# Patient Record
(demographics unavailable — no encounter records)

---

## 2017-01-02 NOTE — EDDOCDS
Physician Documentation                                                                           

[facility]                                                                         

Name: Melva Tracy                                                                                

Age: 29 yrs                                                                                       

Sex: Female                                                                                       

: 1987                                                                                   

MRN: [id number]                                                                                     

Arrival Date: 2017                                                                          

Time: 16:58                                                                                       

Account#: [id number]                                                                              

Bed I4 / M4                                                                                       

Private MD: Unknown, Family                                                                     

Disposition:                                                                                      

17 22:18 Discharged to Home/Self Care. Impression: Threatened .                     

- Condition is Stable.                                                                            

- Discharge Instructions: Threatened Miscarriage.                                                 

                                                                                                  

- Medication Reconciliation, Local Pharmacy Hours form.                                           

- Follow up: Private Physician; When: 2 - 3 days; Reason: Recheck today's complaints,             

Continuance of care.                                                                            

- Problem is new.                                                                                 

- Symptoms have improved.                                                                         

- Notes: PLEASE FOLLOW UP WITH DR BHAKTA OR AN OB DOCTOR OF YOUR CHOICE , RETURN TO              

THE ER IF THE SYMPTOMS WORSEN OR BECOME CONCERNING, IF YOU ARE UNABLE TO FOLLOW UP              

WITH DR BHAKTA, PLEASE RETURN TO THE ER IN 48 HOURS FOR A REPEAT PREGNANCY HORMONE             

                                                                                                  

                                                                                                  

Historical:                                                                                       

- Allergies: no known allergies;                                                                  

- Home Meds:                                                                                      

1. none                                                                                         

- PSHx: none;                                                                                     

- Social history: Smoking status: Patient/guardian denies using No barriers to                    

communication noted, The patient speaks fluent English.                                         

- Family history: Not pertinent.                                                                  

- : The pt / caregiver states he / she is not on anticoagulants. Home medication list             

is obtained from the patient.                                                                   

- Exposure Risk Screening:: None identified.                                                      

                                                                                                  

                                                                                                  

OB/GYN:                                                                                           

                                                                                             

17:14  2, Full Term 1, Premature 0,  0, Living 1, LMP 2016, Pregnancy    dls 

      Verified, EDC 2017, Gestational age from LMP: 7 weeks 3 days                           

                                                                                                  

Vital Signs:                                                                                      

17:00  / 63; Pulse 80; Resp 18 S; Temp 98.5(O); Pulse Ox 99% on R/A; Weight 45.36 kg /  dd6 

      100 lbs (R); Height 4 ft. 11 in. (149.86 cm) (R);                                           

22:30 BP 97 / 55 LA Sitting (auto/reg); Pulse 62 MON; Resp 18 S; Temp 98.5(TE); Pulse Ox 98%  cln 

      on R/A; Pain 0/10;                                                                          

17:00 Body Mass Index 20.20 (45.36 kg, 149.86 cm)                                             dd6 

                                                                                                  

MDM:                                                                                              

18:51 Financial registration complete.                                                        gjb 

19:02 IV Saline Lock ordered.                                                                 ck7 

19:02 NS 0.9% 1000 ml IV at bolus once ordered.                                               ck7 

19:02 Set up pelvic ordered.                                                                  ck7 

19:03 Type & Screen Ordered.                                                                  EDMS

19:03 CBC with Diff Ordered.                                                                  EDMS

19:03 UA Ordered.                                                                             EDMS

19:03 Hcg, Serum Quantitative Ordered.                                                        EDMS

19:03 GC & Chlamydia Amplification Ordered.                                                   EDMS

19:03 Urine Culture Ordered.                                                                  EDMS

19:03 Wet Prep Ordered.                                                                       EDMS

19:03 US 1st trimester Ordered.                                                               EDMS

20:11 [facility] Payment Agreement was scanned into [employer] and attached to record.               gjb 

20:42 DUPLEX SCAN LIMITED (DOPPLER) Ordered.                                                  EDMS

21:15 UA Reviewed.                                                                            ck7 

21:15 CBC with Diff Reviewed.                                                                 ck7 

21:15 Type & Screen Reviewed.                                                                 ck7

21:15 Hcg, Serum Quantitative Reviewed.                                                       ck7 

21:15 US 1st trimester Reviewed.                                                              ck7 

21:44 Wet Prep Reviewed.                                                                      ck7 

                                                                                                  

Administered Medications:                                                                         

19:27 Drug: NS 0.9% 1000 ml [sodium chloride 0.9 % intravenous solution] Route: IV; Rate:     lf1 

      bolus; Site: right antecubital;                                                             

                                                                                                  

                                                                                                  

Signatures:                                                                                       

Dispatcher [employer]                           Edie Munguia, RN                        NETTIE   dls                                                  

Silvia Colon RN                            RN   lf1                                                  

Buster Gauthier, RPA-C            [id number]                                                  

Rhys MarquezRN                        RN   Chika Wynne                                                  

                                                                                                  

The chart was reviewed and I authenticate all verbal orders and agree with the evaluation and 
treatment provided.Attachments:

20:11 [facility] Payment Agreement                                                                [email or web address] 

                                                                                                  

**************************************************************************************************

MTDD

## 2017-01-02 NOTE — EDDOCDS
Nurse's Notes                                                                                     

[facility]                                                                         

Name: Melva Tracy                                                                                

Age: 29 yrs                                                                                       

Sex: Female                                                                                       

: 1987                                                                                   

MRN: [id number]                                                                                     

Arrival Date: 2017                                                                          

Time: 16:58                                                                                       

Account#: [id number]                                                                              

Bed I4 / M4                                                                                       

Private MD: Unknown, Family Dr                                                                    

Diagnosis: Threatened                                                                     

                                                                                                  

Presentation:                                                                                     

                                                                                             

17:12 Presenting complaint: Patient states: Pt presents with vaginal bleeding bright red on   dls 

      tissue and abdominal cramping onset this morning. Pt thinks she is about 7 weeks            

      pregnant no prenatal care yet. Risk factors: The patient reports no loss of                 

      conciousness prior to arrival. This patient has not had a hysterectomy. This patient        

      has not begun menopause. Adult Sepsis Screening: The patient does not have new or           

      worsening altered mentation. Patient's respiratory rate is less than 22. Systolic blood     

      pressure is greater than 100. Patient has a qSOFA score of 0- Negative Sepsis Screen.       

      Suicide/Homicide risk assessment- the patient denies having any suicidal and/or             

      homicidal ideations and does not present with any other emotional, behavioral or mental     

      health complaints.  Status: Patient is not a  or              

      dependent. Transition of care: patient was not received from another setting of care.       

17:12 Acuity: ARPAN Level 3                                                                     dls 

17:12 Method Of Arrival: Walkin/Carried/Asstd                                                 dls 

                                                                                                  

Triage Assessment:                                                                                

17:14 General: Appears in no apparent distress, slender, well developed, well nourished, well dls 

      groomed, Behavior is cooperative. Pain: Pain currently is 2 out of 10 on a pain scale.      

      HIV screening NA for this visit Offered previously. : Reports vaginal bleeding that       

      is bright red.                                                                              

                                                                                                  

OB/GYN:                                                                                           

17:14  2, Full Term 1, Premature 0,  0, Living 1, LMP 2016, Pregnancy    dls 

      Verified, EDC 2017, Gestational age from LMP: 7 weeks 3 days                           

                                                                                                  

Historical:                                                                                       

- Allergies: no known allergies;                                                                  

- Home Meds:                                                                                      

1. none                                                                                         

- PSHx: none;                                                                                     

- Social history: Smoking status: Patient/guardian denies using No barriers to                    

communication noted, The patient speaks fluent English.                                         

- Family history: Not pertinent.                                                                  

- : The pt / caregiver states he / she is not on anticoagulants. Home medication list             

is obtained from the patient.                                                                   

- Exposure Risk Screening:: None identified.                                                      

                                                                                                  

                                                                                                  

Screenin:28 Screening information is obtained from the patient. Fall risk: No risks identified.     lf1 

      Assistance ADL's: requires no assistance with activities of daily living. Abuse/DV          

      Screen: The patient / caregiver reports he/she is: not in a situation that causes fear,     

      pain or injury. Nutritional screening: No deficits noted. Advance Directives:               

      Currently, there is no health care proxy. home support is adequate.                         

                                                                                                  

Assessment:                                                                                       

19:28 Adult Sepsis Screening: The patient does not have new or worsening altered mentation.   lf1 

      Patient's respiratory rate is less than 22. Systolic blood pressure is greater than         

      100. Patient has a qSOFA score of 0- Negative Sepsis Screen. General: Appears in no         

      apparent distress, comfortable, Behavior is cooperative. Neurological: Level of             

      Consciousness is awake, alert, Oriented to person, place, time. EENT: No deficits           

      noted. Cardiovascular: Chest pain is denied. Respiratory: Respiratory effort is even,       

      unlabored. GI: No deficits noted. : Def to provider Reports vaginal bleeding that is      

      bright red light flow. Derm: Skin is normal.                                                

22:33 General: Patient instructed on discharge instructions. Patient asked if there were any  jmb 

      questions regarding discharge, patient stated no. IV discontinued per hospital policy.      

      Patient signed discharge instructions. Patient discharged in stable condition. .            

                                                                                                  

Vital Signs:                                                                                      

17:00  / 63; Pulse 80; Resp 18 S; Temp 98.5(O); Pulse Ox 99% on R/A; Weight 45.36 kg    dd6 

      (R); Height 4 ft. 11 in. (149.86 cm) (R);                                                   

22:30 BP 97 / 55 LA Sitting (auto/reg); Pulse 62 MON; Resp 18 S; Temp 98.5(TE); Pulse Ox 98%  cln 

      on R/A; Pain 0/10;                                                                          

17:00 Body Mass Index 20.20 (45.36 kg, 149.86 cm)                                             dd6 

                                                                                                  

Vitals:                                                                                           

17:00 Log In Time: 2017 at 16:58.                                                 dd6 

                                                                                                  

ED Course:                                                                                        

16:59 Patient visited by Aurelio Caceres, FELIPE.                                             dd6 

16:59 Unknown, Family  is Private Physician.                                                dd6 

16:59 Patient moved to Waiting                                                                dd6 

17:00 Patient moved to Pre RCE                                                                dd6 

17:14 Triage Initiated                                                                        dls 

18:22 Patient moved to Triage 1                                                               ct3 

18:37 [facility]                                                   ck7 

18:37 Elizabeth Plunkett MD is Attending Physician.                                               ck7 

18:37 Patient visited by Buster Gauthier RPA-C.                                        ck7 

19:02 Patient moved to I4 / M4                                                                b 

19:14 Patient visited by Buster Gauthier RPA-C.                                        ck7 

19:25 Hcg, Serum Quantitative Sent.                                                           lf1 

19:28 The patient / caregiver is instructed regarding the plan of care and ED course.         lf1 

      Accompanied by Family Member, Bed in low position. Call light in reach.                     

19:28 Type & Screen Sent.                                                                     lf1

19:28 Urine Culture Sent.                                                                     lf1 

19:28 UA Sent.                                                                                lf1 

19:28 CBC with Diff Sent.                                                                     lf1 

19:28 Inserted saline lock: 20 gauge in right antecubital area and blood collected. The       lf1 

      patient tolerated the procedure well. Labs drawn. (by ED staff). Sent per order to lab.     

      Urine collected. Clean catch specimen.                                                      

19:32 Patient visited by Silvia Colon RN.                                                        lf1 

19:53 Patient moved to Ultrasound                                                             dmg 

20:10 Patient name changed from Melva\S\\S\Tracy\S\ to Melva\S\[place]\S\Tracy.                       
   EDMS

20:11 [facility] Payment Agreement was scanned into [employer] and attached to record.               gjb 

20:19 Patient moved to I4 / M4                                                                dmg 

20:23 Patient visited by Buster Gauthier RPA-C.                                        ck7 

20:59 US 1st trimester Returned.                                                              EDMS

21:02 Patient visited by Buster Gauthier RPA-C.                                        ck7 

21:44 Patient visited by Buster Gauthier RPA-C.                                        ck7 

22:16 Patient visited by Buster Gauthier RPA-C.                                        ck7 

22:30 Patient visited by Jeanette Aldana PCA.                                               cln 

22:33 Discontinued lock intact, bleeding controlled, pressure dressing applied, No            jmb 

      redness/swelling at site. No procedures done that require assistance.                       

                                                                                                  

Administered Medications:                                                                         

19:27 Drug: NS 0.9% 1000 ml [sodium chloride 0.9 % intravenous solution] Route: IV; Rate:     lf1 

      bolus; Site: right antecubital;                                                             

                                                                                                  

                                                                                                  

Order Results:                                                                                    

Lab Order: CBC with Diff; SPEC'M 17 19:22                                                   

      Test: WHITE BLOOD COUNT; Value: 8.6; Range: 4.0-10.0; Units: K/mm3; Status: F               

      Test: RED BLOOD COUNT; Value: 4.23; Range: 4.00-5.40; Units: M/mm3; Status: F               

      Test: HEMOGLOBIN; Value: 13.3; Range: 12.0-16.0; Units: g/dl; Status: F                     

      Test: HEMATOCRIT; Value: 37.2; Range: 36.0-47.0; Units: %; Status: F                        

      Test: MEAN CORPUSCULAR VOLUME; Value: 87.9; Range: 80.0-96.0; Units: fl; Status: F          

      Test: MEAN CORPUSCULAR HEMOGLOBIN; Value: 31.3; Range: 27.0-33.0; Units: pg; Status: F      

      Test: MEAN CORPUSCULAR HGB CONC; Value: 35.6; Range: 32.0-36.5; Units: g/dl; Status: F      

      Test: RED CELL DISTRIBUTION WIDTH; Value: 12.0; Range: 11.5-14.5; Units: %; Status: F       

      Test: PLATELET COUNT, AUTOMATED; Value: 321; Range: 150-450; Units: k/mm3; Status: F        

      Test: NEUTROPHILS %; Value: 61.7; Range: 36.0-66.0; Units: %; Status: F                     

      Test: LYMPH %; Value: 29.4; Range: 24.0-44.0; Units: %; Status: F                           

      Test: MONO %; Value: 4.8; Range: 0.0-5.0; Units: %; Status: F                               

      Test: EOS %; Value: 1.8; Range: 0.0-3.0; Units: %; Status: F                                

      Test: BASO %; Value: 0.3; Range: 0.0-1.0; Units: %; Status: F                               

      Test: LARGE UNSTAINED CELL %; Value: 2.0; Range: 0.0-4.0; Units: %; Status: F               

      Test: NEUTROPHILS #; Value: 5.3; Range: 1.8-7.7; Units: K/mm3; Status: F                    

      Test: LYMPH #; Value: 2.5; Range: 1.5-6.5; Units: K/mm3; Status: F                          

      Test: MONO #; Value: 0.4; Range: 0.0-0.8; Units: K/mm3; Status: F                           

      Test: EOS #; Value: 0.2; Range: 0.0-0.50; Units: K/mm3; Status: F                           

      Test: BASO #; Value: 0.0; Range: 0.0-0.2; Units: K/mm3; Status: F                           

      Test: LARGE UNSTAINED CELL #; Value: 0.2; Range: 0.0-0.4; Units: K/mm3; Status: F           

Lab Order: UA; SPEC'M 17 19:23                                                              

      Test: APPEARANCE, URINE; Value: HAZY; Range: CLEAR; Status: F                               

      Test: COLOR, URINE; Value: YELLOW; Range: YELLOW; Status: F                                 

      Test: PH,URINE; Value: 6.0; Range: 5.0-9.0; Units: UNITS; Status: F                         

      Test: SPECIFIC GRAVITY URINE AUTO; Value: 1.025; Range: 1.002-1.035; Status: F              

      Test: PROTEIN, URINE AUTO; Value: NEGATIVE; Range: NEGATIVE; Units: mg/dL; Status: F        

      Test: GLUCOSE, URINE (UA) AUTO; Value: NEGATIVE; Range: NEGATIVE; Units: mg/dL; Status:     

      F                                                                                           

      Test: KETONE, URINE AUTO; Value: NEGATIVE; Range: NEGATIVE; Units: mg/dL; Status: F         

      Test: UROBILINOGEN, URINE AUTO; Value: 0.2; Range: 0.0-2.0; Units: mg/dL; Status: F         

      Test: BILIRUBIN, URINE AUTO; Value: NEGATIVE; Range: NEGATIVE; Status: F                    

      Test: NITRITE, URINE AUTO; Value: NEGATIVE; Range: NEGATIVE; Status: F                      

      Test: LEUKOCYTE ESTERASE, URINE AUTO; Value: TRACE; Range: NEGATIVE; Abnormal: Above        

      high normal; Status: F                                                                      

      Test: BLOOD, URINE BLOOD; Value: NEGATIVE; Range: NEGATIVE; Status: F                       

      Test: WBC, URINE AUTO; Value: 1; Range: 0-3; Units: /HPF; Status: F                         

      Test: RBC, URINE AUTO; Value: 1; Range: 0-3; Units: /HPF; Status: F                         

      Test: BACTERIA, URINE AUTO; Value: NEGATIVE; Range: NEGATIVE; Status: F                     

      Test: SQUAMOUS EPITHELIAL CELL UR AU; Value: 3; Range: 0-6; Units: /HPF; Status: F          

      Test: MUCUS, URINE; Value: SMALL; Range: NEGATIVE; Status: F                                

      Test: HYALINE CAST, URINE AUTO; Value: 0; Range: 0-1; Units: /LPF; Status: F                

Lab Order: Type & Screen; SPEC'M 17 19:22                                                 

      Test: BLOOD TYPE; Value: B POS; Status: F                                                   

      Test: AB SCREEN (INDIRECT GEOFF)GEL; Value: NEGATIVE; Status: F                            

Lab Order: Hcg, Serum Quantitative; SPEC'M 17 19:23                                         

      Test: HCG, SERUM QUANTITATIVE; Value: 50047; Units: MIU/ML; Status: F                       

      Test Note: &nbsp;; GESTATIONAL AGE APPROXIMATE HCG RANGE (MIU/ML) -----------             

      -------------- 0.2-1 WEEK 5-50 1-2 WEEKS  2-3 WEEKS 100-5,000 3-4 WEEKS               

      500-10,000 4-5 WEEKS 1,000-50,000 5-6 WEEKS 10,000-100,000 6-8 WEEKS 15,000-200,000 2-3     

      MONTHS 10,000-100,000 NON PREGNANT FEMALES LESS THAN 3.0 Patient samples may contain        

      human heterophilic antibodies that could react with immunoassays to give falsely            

      elevated or depressed results. This assay has been designed to minimize interference        

      from heterophilic antibodies. Elevated hCG levels have also been associated with            

      trophoblastic disease and nontrophoblastic neoplasms. The possibility of having these       

      diseases should be considered before a diagnosis of pregnancy is made. This test is not     

      intended for use as a surrogate marker for aiding in the diagnosis or monitoring the        

      treatment of cancer patients. Siemens Vista methodology.                                    

Lab Order: [facility]                                                        

      Test: WET PREP; Value: WET PREP RESULT; Status: F                                           

      Test: WET PREP; Value: MANY EPITHELIAL CELLS PRESENT; Status: F                             

      Test: WET PREP; Value: MANY WBC; Status: F                                                  

      Test: WET PREP; Value: MANY SHORT RODS PRESENT; Status: F                                   

      Test: WET PREP; Value: FEW LONG RODS PRESENT; Status: F                                     

      Test: WET PREP; Value: Comments:; Status: F                                                 

      Test Note: &nbsp;; Specimen did not meet the 1 hour time limit from collection to         

      examination. Trichomonas vaginalis loses motility quickly therefore, samples need to be     

      examined within 1 hour of collection to optimally identify this organism.                   

                                                                                                  

Radiology Order: US 1st trimester                                                                 

      Test: US 1st trimester                                                                      

      REASON FOR EXAMINATION: Bleeding; Clinical: Early pregnancy with vaginal bleeding.; ;       

      Technique: Transabdominal obstetrical ultrasound with color Doppler evaluation; of the      

      maternal ovaries and gestation.; ; Findings:; Anteverted uterus measures 10.4 x 5.5 x       

      7.6 cm. A single live early intrauterine; pregnancy is identified. Gestational sac with     

      yolk sac and fetal pole noted.; CRL of 6 mm corresponds to 6 weeks 3 days gestational       

      age with estimated date of; delivery 2017. Fetal heart rate equals 100 beats per      

      minute. Two; subchorionic hemorrhages are identified measuring 2.9 x 1.9 x 2.9 cm along     

      the; lower uterine segment and 1.7 x 1.5 x 1.0 cm along the left side of the;               

      gestational sac.; ; Maternal ovaries are normal in appearance and vascularity. Left         

      ovary measures; 3.0 x 3.6 x 2.0 cm; RI equal 0.59. Right ovary measures 3.4 x 2.5 x 2.7     

      cm; RI; equal 0.62. No significant pelvic fluid.; ; Impression:; 1. Single live early       

      intrauterine pregnancy at 6 weeks 3 days gestational age.; Complete anatomical              

      assessment should be performed at 19-20 weeks.; 2. Subchorionic hemorrhages as noted        

      above.; ; ; Signed by; Frank Wagoner MD 2017 08:40 P;                                

Outcome:                                                                                          

22:18 Discharge ordered by Provider.                                                          ck7 

22:33 Discharge Assessment: Patient awake, alert and oriented x 3. No cognitive and/or        jmb 

      functional deficits noted. Patient verbalized understanding of disposition                  

      instructions. Patient awake and alert. obeys commands, Oriented to person, place and        

      time. Patient verbalized understanding of disposition instructions. Patient has no          

      functional deficits. patient administered narcotics - no. The following High Risk           

      Discharge criteria are identified: None. Discharged to home ambulatory, with                

      significant other. Condition: stable. Discharge instructions given to patient,              

      Instructed on discharge instructions, follow up and referral plans. Demonstrated            

      understanding of instructions, Pt was receptive of discharge instructions/ teaching. No     

      special radiology studies were completed. Property sent home with patient.                  

22:35 Patient left the ED.                                                                    luis m 

                                                                                                  

Signatures:                                                                                       

Dispatcher [employer]                           EDEdie Burch, RN                        RN   Theresa Zazueta LisaRN                            RN   lf1                                                  

Aurelio Caceres, PCA                 PCA  dd6                                                  

Sewell, Ariella, PCA                  PCA  ct3                                                  

Buster Gauthier, RPA-C            RPA-Cck7                                                  

Rhys Marquez RN RN jmb Beck, Gabriela gjb Nichols, Jeanette, PCA                   PCA  cln                                                  

                                                                                                  

**************************************************************************************************

MTDD

## 2017-01-02 NOTE — REP
Clinical:  Early pregnancy with vaginal bleeding.

 

Technique:  Transabdominal obstetrical ultrasound with color Doppler evaluation

of the maternal ovaries and gestation.

 

Findings:

Anteverted uterus measures 10.4 x 5.5 x 7.6 cm.  A single live early intrauterine

pregnancy is identified.  Gestational sac with yolk sac and fetal pole noted.

CRL of 6 mm corresponds to 6 weeks 3 days gestational age with estimated date of

delivery 08/25/2017.  Fetal heart rate equals 100 beats per minute.  Two

subchorionic hemorrhages are identified measuring 2.9 x 1.9 x 2.9 cm along the

lower uterine segment and 1.7 x 1.5 x 1.0 cm along the left side of the

gestational sac.

 

Maternal ovaries are normal in appearance and vascularity. Left ovary measures

3.0 x 3.6 x 2.0 cm; RI equal 0.59.  Right ovary measures 3.4 x 2.5 x 2.7 cm; RI

equal 0.62.  No significant pelvic fluid.

 

Impression:

1.  Single live early intrauterine pregnancy at 6 weeks 3 days gestational age.

Complete anatomical assessment should be performed at 19-20 weeks.

2.  Subchorionic hemorrhages as noted above.

 

 

Signed by

Frank Wagoner MD 01/02/2017 08:40 P

## 2017-01-04 NOTE — EDDOCDS
Nurse's Notes                                                                                     

[facility]                                                                         

Name: Melva Tracy                                                                                

Age: 29 yrs                                                                                       

Sex: Female                                                                                       

: 1987                                                                                   

MRN: [id number]                                                                                     

Arrival Date: 2017                                                                          

Time: 16:58                                                                                       

Account#: [id number]                                                                              

Bed I4 / M4                                                                                       

Private MD: Unknown, Family Dr                                                                    

Diagnosis: Threatened                                                                     

                                                                                                  

Presentation:                                                                                     

                                                                                             

17:12 Presenting complaint: Patient states: Pt presents with vaginal bleeding bright red on   dls 

      tissue and abdominal cramping onset this morning. Pt thinks she is about 7 weeks            

      pregnant no prenatal care yet. Risk factors: The patient reports no loss of                 

      conciousness prior to arrival. This patient has not had a hysterectomy. This patient        

      has not begun menopause. Adult Sepsis Screening: The patient does not have new or           

      worsening altered mentation. Patient's respiratory rate is less than 22. Systolic blood     

      pressure is greater than 100. Patient has a qSOFA score of 0- Negative Sepsis Screen.       

      Suicide/Homicide risk assessment- the patient denies having any suicidal and/or             

      homicidal ideations and does not present with any other emotional, behavioral or mental     

      health complaints.  Status: Patient is not a  or              

      dependent. Transition of care: patient was not received from another setting of care.       

17:12 Acuity: ARPAN Level 3                                                                     dls 

17:12 Method Of Arrival: Walkin/Carried/Asstd                                                 dls 

                                                                                                  

Triage Assessment:                                                                                

17:14 General: Appears in no apparent distress, slender, well developed, well nourished, well dls 

      groomed, Behavior is cooperative. Pain: Pain currently is 2 out of 10 on a pain scale.      

      HIV screening NA for this visit Offered previously. : Reports vaginal bleeding that       

      is bright red.                                                                              

                                                                                                  

OB/GYN:                                                                                           

17:14  2, Full Term 1, Premature 0,  0, Living 1, LMP 2016, Pregnancy    dls 

      Verified, EDC 2017, Gestational age from LMP: 7 weeks 3 days                           

                                                                                                  

Historical:                                                                                       

- Allergies: no known allergies;                                                                  

- Home Meds:                                                                                      

1. none                                                                                         

- PSHx: none;                                                                                     

- Social history: Smoking status: Patient/guardian denies using No barriers to                    

communication noted, The patient speaks fluent English.                                         

- Family history: Not pertinent.                                                                  

- : The pt / caregiver states he / she is not on anticoagulants. Home medication list             

is obtained from the patient.                                                                   

- Exposure Risk Screening:: None identified.                                                      

                                                                                                  

                                                                                                  

Screenin:28 Screening information is obtained from the patient. Fall risk: No risks identified.     lf1 

      Assistance ADL's: requires no assistance with activities of daily living. Abuse/DV          

      Screen: The patient / caregiver reports he/she is: not in a situation that causes fear,     

      pain or injury. Nutritional screening: No deficits noted. Advance Directives:               

      Currently, there is no health care proxy. home support is adequate.                         

                                                                                                  

Assessment:                                                                                       

19:28 Adult Sepsis Screening: The patient does not have new or worsening altered mentation.   lf1 

      Patient's respiratory rate is less than 22. Systolic blood pressure is greater than         

      100. Patient has a qSOFA score of 0- Negative Sepsis Screen. General: Appears in no         

      apparent distress, comfortable, Behavior is cooperative. Neurological: Level of             

      Consciousness is awake, alert, Oriented to person, place, time. EENT: No deficits           

      noted. Cardiovascular: Chest pain is denied. Respiratory: Respiratory effort is even,       

      unlabored. GI: No deficits noted. : Def to provider Reports vaginal bleeding that is      

      bright red light flow. Derm: Skin is normal.                                                

22:33 General: Patient instructed on discharge instructions. Patient asked if there were any  jmb 

      questions regarding discharge, patient stated no. IV discontinued per hospital policy.      

      Patient signed discharge instructions. Patient discharged in stable condition. .            

                                                                                                  

Vital Signs:                                                                                      

17:00  / 63; Pulse 80; Resp 18 S; Temp 98.5(O); Pulse Ox 99% on R/A; Weight 45.36 kg    dd6 

      (R); Height 4 ft. 11 in. (149.86 cm) (R);                                                   

22:30 BP 97 / 55 LA Sitting (auto/reg); Pulse 62 MON; Resp 18 S; Temp 98.5(TE); Pulse Ox 98%  cln 

      on R/A; Pain 0/10;                                                                          

17:00 Body Mass Index 20.20 (45.36 kg, 149.86 cm)                                             dd6 

                                                                                                  

Vitals:                                                                                           

17:00 Log In Time: 2017 at 16:58.                                                 dd6 

                                                                                                  

ED Course:                                                                                        

16:59 Patient visited by Aurelio Caceres, FELIPE.                                             dd6 

16:59 Unknown, Family  is Private Physician.                                                dd6 

16:59 Patient moved to Waiting                                                                dd6 

17:00 Patient moved to Pre RCE                                                                dd6 

17:14 Triage Initiated                                                                        dls 

18:22 Patient moved to Triage 1                                                               ct3 

18:37 [facility]                                                   ck7 

18:37 Elizabeth Plunkett MD is Attending Physician.                                               ck7 

18:37 Patient visited by Buster Gauthier RPA-C.                                        ck7 

19:02 Patient moved to I4 / M4                                                                b 

19:14 Patient visited by Buster Gauthier RPA-C.                                        ck7 

19:25 Hcg, Serum Quantitative Sent.                                                           lf1 

19:28 The patient / caregiver is instructed regarding the plan of care and ED course.         lf1 

      Accompanied by Family Member, Bed in low position. Call light in reach.                     

19:28 Type & Screen Sent.                                                                     lf1

19:28 Urine Culture Sent.                                                                     lf1 

19:28 UA Sent.                                                                                lf1 

19:28 CBC with Diff Sent.                                                                     lf1 

19:28 Inserted saline lock: 20 gauge in right antecubital area and blood collected. The       lf1 

      patient tolerated the procedure well. Labs drawn. (by ED staff). Sent per order to lab.     

      Urine collected. Clean catch specimen.                                                      

19:32 Patient visited by Silvia Colon RN.                                                        lf1 

19:53 Patient moved to Ultrasound                                                             dmg 

20:10 Patient name changed from Melva\S\\S\Tracy\S\ to Melva\S\[place]\S\Tracy.                       
   EDMS

20:11 [facility] Payment Agreement was scanned into [employer]book[employer] and attached to record.               gjb 

20:19 Patient moved to I4 / M4                                                                dmg 

20:23 Patient visited by Buster Gauthier RPA-C.                                        ck7 

20:59 US 1st trimester Returned.                                                              EDMS

21:02 Patient visited by Buster Gauthier RPA-C.                                        ck7 

21:44 Patient visited by Buster Gauthier RPA-C.                                        ck7 

22:16 Patient visited by Buster Gauthier RPA-C.                                        ck7 

22:30 Patient visited by Jeanette Aldana PCA.                                               cln 

22:33 Discontinued lock intact, bleeding controlled, pressure dressing applied, No            jmb 

      redness/swelling at site. No procedures done that require assistance.                       

                                                                                             

05:38 T-Sheet-- Draft Copy was scanned into DocbookMD and attached to record.                   lja 

                                                                                                  

Administered Medications:                                                                         

                                                                                             

19:27 Drug: NS 0.9% 1000 ml [sodium chloride 0.9 % intravenous solution] Route: IV; Rate:     lf1 

      bolus; Site: right antecubital;                                                             

                                                                                                  

                                                                                                  

Order Results:                                                                                    

Lab Order: CBC with Diff; SPEC'M 17 19:22                                                   

      Test: WHITE BLOOD COUNT; Value: 8.6; Range: 4.0-10.0; Units: K/mm3; Status: F               

      Test: RED BLOOD COUNT; Value: 4.23; Range: 4.00-5.40; Units: M/mm3; Status: F               

      Test: HEMOGLOBIN; Value: 13.3; Range: 12.0-16.0; Units: g/dl; Status: F                     

      Test: HEMATOCRIT; Value: 37.2; Range: 36.0-47.0; Units: %; Status: F                        

      Test: MEAN CORPUSCULAR VOLUME; Value: 87.9; Range: 80.0-96.0; Units: fl; Status: F          

      Test: MEAN CORPUSCULAR HEMOGLOBIN; Value: 31.3; Range: 27.0-33.0; Units: pg; Status: F      

      Test: MEAN CORPUSCULAR HGB CONC; Value: 35.6; Range: 32.0-36.5; Units: g/dl; Status: F      

      Test: RED CELL DISTRIBUTION WIDTH; Value: 12.0; Range: 11.5-14.5; Units: %; Status: F       

      Test: PLATELET COUNT, AUTOMATED; Value: 321; Range: 150-450; Units: k/mm3; Status: F        

      Test: NEUTROPHILS %; Value: 61.7; Range: 36.0-66.0; Units: %; Status: F                     

      Test: LYMPH %; Value: 29.4; Range: 24.0-44.0; Units: %; Status: F                           

      Test: MONO %; Value: 4.8; Range: 0.0-5.0; Units: %; Status: F                               

      Test: EOS %; Value: 1.8; Range: 0.0-3.0; Units: %; Status: F                                

      Test: BASO %; Value: 0.3; Range: 0.0-1.0; Units: %; Status: F                               

      Test: LARGE UNSTAINED CELL %; Value: 2.0; Range: 0.0-4.0; Units: %; Status: F               

      Test: NEUTROPHILS #; Value: 5.3; Range: 1.8-7.7; Units: K/mm3; Status: F                    

      Test: LYMPH #; Value: 2.5; Range: 1.5-6.5; Units: K/mm3; Status: F                          

      Test: MONO #; Value: 0.4; Range: 0.0-0.8; Units: K/mm3; Status: F                           

      Test: EOS #; Value: 0.2; Range: 0.0-0.50; Units: K/mm3; Status: F                           

      Test: BASO #; Value: 0.0; Range: 0.0-0.2; Units: K/mm3; Status: F                           

      Test: LARGE UNSTAINED CELL #; Value: 0.2; Range: 0.0-0.4; Units: K/mm3; Status: F           

Lab Order: UA; SPEC'M 17 19:23                                                              

      Test: APPEARANCE, URINE; Value: HAZY; Range: CLEAR; Status: F                               

      Test: COLOR, URINE; Value: YELLOW; Range: YELLOW; Status: F                                 

      Test: PH,URINE; Value: 6.0; Range: 5.0-9.0; Units: UNITS; Status: F                         

      Test: SPECIFIC GRAVITY URINE AUTO; Value: 1.025; Range: 1.002-1.035; Status: F              

      Test: PROTEIN, URINE AUTO; Value: NEGATIVE; Range: NEGATIVE; Units: mg/dL; Status: F        

      Test: GLUCOSE, URINE (UA) AUTO; Value: NEGATIVE; Range: NEGATIVE; Units: mg/dL; Status:     

      F                                                                                           

      Test: KETONE, URINE AUTO; Value: NEGATIVE; Range: NEGATIVE; Units: mg/dL; Status: F         

      Test: UROBILINOGEN, URINE AUTO; Value: 0.2; Range: 0.0-2.0; Units: mg/dL; Status: F         

      Test: BILIRUBIN, URINE AUTO; Value: NEGATIVE; Range: NEGATIVE; Status: F                    

      Test: NITRITE, URINE AUTO; Value: NEGATIVE; Range: NEGATIVE; Status: F                      

      Test: LEUKOCYTE ESTERASE, URINE AUTO; Value: TRACE; Range: NEGATIVE; Abnormal: Above        

      high normal; Status: F                                                                      

      Test: BLOOD, URINE BLOOD; Value: NEGATIVE; Range: NEGATIVE; Status: F                       

      Test: WBC, URINE AUTO; Value: 1; Range: 0-3; Units: /HPF; Status: F                         

      Test: RBC, URINE AUTO; Value: 1; Range: 0-3; Units: /HPF; Status: F                         

      Test: BACTERIA, URINE AUTO; Value: NEGATIVE; Range: NEGATIVE; Status: F                     

      Test: SQUAMOUS EPITHELIAL CELL UR AU; Value: 3; Range: 0-6; Units: /HPF; Status: F          

      Test: MUCUS, URINE; Value: SMALL; Range: NEGATIVE; Status: F                                

      Test: HYALINE CAST, URINE AUTO; Value: 0; Range: 0-1; Units: /LPF; Status: F                

Lab Order: Urine Culture; SPEC'M 17 19:23                                                   

      Test: URINE CULTURE; Value: URINE CULTURE RESULT NO GROWTH; Status: F                       

Lab Order: Type & Screen; SPEC 17 19:22                                                 

      Test: BLOOD TYPE; Value: B POS; Status: F                                                   

      Test: AB SCREEN (INDIRECT GEOFF)GEL; Value: NEGATIVE; Status: F                            

Lab Order: Hcg, Serum Quantitative; SPEC'M 17 19:23                                         

      Test: HCG, SERUM QUANTITATIVE; Value: [id number]; Units: MIU/ML; Status: F                       

      Test Note: &nbsp;; GESTATIONAL AGE APPROXIMATE HCG RANGE (MIU/ML) -----------             

      -------------- 0.2-1 WEEK 5-50 1-2 WEEKS  2-3 WEEKS 100-5,000 3-4 WEEKS               

      500-10,000 4-5 WEEKS 1,000-50,000 5-6 WEEKS 10,000-100,000 6-8 WEEKS 15,000-200,000 2-3     

      MONTHS 10,000-100,000 NON PREGNANT FEMALES LESS THAN 3.0 Patient samples may contain        

      human heterophilic antibodies that could react with immunoassays to give falsely            

      elevated or depressed results. This assay has been designed to minimize interference        

      from heterophilic antibodies. Elevated hCG levels have also been associated with            

      trophoblastic disease and nontrophoblastic neoplasms. The possibility of having these       

      diseases should be considered before a diagnosis of pregnancy is made. This test is not     

      intended for use as a surrogate marker for aiding in the diagnosis or monitoring the        

      treatment of cancer patients. Siemens Vista methodology.                                    

Lab Order: GC & Chlamydia Amplification; SPEC 17 19:10                                  

      Test: CHLAMYDIA DNA AMPLIFICATION; Value: NEGATIVE; Range: NEGATIVE; Status: F              

      Test: GC DNA AMPLIFICATION; Value: NEGATIVE; Range: NEGATIVE; Status: F                     

Lab Order: Wet Prep; SPEC'M 17 19:10                                                        

      Test: WET PREP; Value: WET PREP RESULT; Status: F                                           

      Test: WET PREP; Value: MANY EPITHELIAL CELLS PRESENT; Status: F                             

      Test: WET PREP; Value: MANY WBC; Status: F                                                  

      Test: WET PREP; Value: MANY SHORT RODS PRESENT; Status: F                                   

      Test: WET PREP; Value: FEW LONG RODS PRESENT; Status: F                                     

      Test: WET PREP; Value: Comments:; Status: F                                                 

      Test Note: &nbsp;; Specimen did not meet the 1 hour time limit from collection to         

      examination. Trichomonas vaginalis loses motility quickly therefore, samples need to be     

      examined within 1 hour of collection to optimally identify this organism.                   

                                                                                                  

Radiology Order: US 1st trimester                                                                 

      Test: US 1st trimester                                                                      

      REASON FOR EXAMINATION: Bleeding; Clinical: Early pregnancy with vaginal bleeding.; ;       

      Technique: Transabdominal obstetrical ultrasound with color Doppler evaluation; of the      

      maternal ovaries and gestation.; ; Findings:; Anteverted uterus measures 10.4 x 5.5 x       

      7.6 cm. A single live early intrauterine; pregnancy is identified. Gestational sac with     

      yolk sac and fetal pole noted.; CRL of 6 mm corresponds to 6 weeks 3 days gestational       

      age with estimated date of; delivery 2017. Fetal heart rate equals 100 beats per      

      minute. Two; subchorionic hemorrhages are identified measuring 2.9 x 1.9 x 2.9 cm along     

      the; lower uterine segment and 1.7 x 1.5 x 1.0 cm along the left side of the;               

      gestational sac.; ; Maternal ovaries are normal in appearance and vascularity. Left         

      ovary measures; 3.0 x 3.6 x 2.0 cm; RI equal 0.59. Right ovary measures 3.4 x 2.5 x 2.7     

      cm; RI; equal 0.62. No significant pelvic fluid.; ; Impression:; 1. Single live early       

      intrauterine pregnancy at 6 weeks 3 days gestational age.; Complete anatomical              

      assessment should be performed at 19-20 weeks.; 2. Subchorionic hemorrhages as noted        

      above.; ; ; Signed by; Frank Wagoner MD 2017 08:40 P;                                

Outcome:                                                                                          

22:18 Discharge ordered by Provider.                                                          ck7 

22:33 Discharge Assessment: Patient awake, alert and oriented x 3. No cognitive and/or        jmb 

      functional deficits noted. Patient verbalized understanding of disposition                  

      instructions. Patient awake and alert. obeys commands, Oriented to person, place and        

      time. Patient verbalized understanding of disposition instructions. Patient has no          

      functional deficits. patient administered narcotics - no. The following High Risk           

      Discharge criteria are identified: None. Discharged to home ambulatory, with                

      significant other. Condition: stable. Discharge instructions given to patient,              

      Instructed on discharge instructions, follow up and referral plans. Demonstrated            

      understanding of instructions, Pt was receptive of discharge instructions/ teaching. No     

      special radiology studies were completed. Property sent home with patient.                  

22:35 Patient left the ED.                                                                    luis m 

                                                                                                  

Signatures:                                                                                       

Dispatcher [employer]                           EDMS                                                 

Edie Pendleton, RN                        RN   Theresa Zazueta Lisa,RN                            RN   lf1                                                  

Aurelio Caceres, PCA                 PCA  dd6                                                  

Ariella Sewell, PCA                  PCA  ct3                                                  

Buster Gauthier, RPA-C            RPA-Cck7                                                  

Rhys MarquezRN                        RN   luis m Ramirez, Chika Laws Crystal, PCA                   PCA  cln                                                  

                                                                                                  

**************************************************************************************************



*** Chart Complete ***
GAURAV

## 2017-01-04 NOTE — EDDOCDS
Physician Documentation                                                                           

[facility]                                                                         

Name: Melva Tracy                                                                                

Age: 29 yrs                                                                                       

Sex: Female                                                                                       

: 1987                                                                                   

MRN: [id number]                                                                                     

Arrival Date: 2017                                                                          

Time: 10:26                                                                                       

Account#: [id number]                                                                              

Bed PR                                                                                      

Private MD: Unknown, Family                                                                     

Disposition:                                                                                      

17 11:47 Discharged to Home/Self Care. Impression: Pregnant state.                          

- Condition is Stable.                                                                            

- Discharge Instructions: First Trimester of Pregnancy.                                           

                                                                                                  

- Medication Reconciliation, Local Pharmacy Hours form.                                           

- Follow up: Emergency Department; When: As needed; Reason: Worsening of conditions.              

Follow up: Javier Rivero; When: Call to arrange an appointment; Reason:                       

Wound/Symptom Recheck, Further diagnostic work-up, Recheck today's complaints,                  

Continuance of care, To establish care.                                                         

- Problem is new.                                                                                 

- Symptoms are unchanged.                                                                         

- Notes: YOUR HCG ON 17 WAS 49,549. TODAY, IT WAS 56,368 WHICH IS A NORMAL INCREASE           

FOR A HEALTHY PREGNANCY. PLEASE CALL THE OB-GYN OFFICE TODAY TO SCHEDULE AN                     

APPOINTMENT TO BE SEEN. LET THEM KNOW YOU WERE SEEN IN THE ER AND THE SYMPTOMS YOU              

WERE HAVING. ANY WORSENING SYMPTOMS, PLEASE RETURN TO THE ER.                                   

                                                                                                  

                                                                                                  

Historical:                                                                                       

- Allergies: no known allergies;                                                                  

- Home Meds:                                                                                      

1. none                                                                                         

- PMHx: none;                                                                                     

- PSHx: none;                                                                                     

- Social history: Smoking status: Patient states was never smoker of tobacco. No                  

barriers to communication noted, The patient speaks fluent English, Speaks                      

appropriately for age.                                                                          

- Family history: Not pertinent.                                                                  

- : The pt / caregiver states he / she is not on anticoagulants. Home medication list             

is obtained from the patient.                                                                   

- Exposure Risk Screening:: None identified.                                                      

                                                                                                  

                                                                                                  

OB/GYN:                                                                                           

                                                                                             

10:32 LMP 2016, Pregnancy Verified, EDC 2017, Gestational age from LMP: 7 weeks 5  mlb1

      days                                                                                        

                                                                                                  

Vital Signs:                                                                                      

10:27  / 61; Pulse 69; Resp 18 S; Temp 97.7(O); Pulse Ox 100% on R/A; Weight 45.36 kg / dd6 

      100 lbs (R); Height 4 ft. 11 in. (149.86 cm) (R);                                           

11:38  / 61; Pulse 62; Resp 18; Temp 97.9(O); Pulse Ox 100% on R/A; Pain 1/10;          ct3 

10:27 Body Mass Index 20.20 (45.36 kg, 149.86 cm)                                             dd6 

                                                                                                  

MDM:                                                                                              

10:39 Hcg, Serum Quantitative Ordered.                                                        EDMS

11:12 Financial registration complete.                                                        lg  

                                                                                                  

Signatures:                                                                                       

Dispatcher [employer]                           EDMS                                                 

Berta Orourke, Reg                    Reg  lg                                                   

Tyrell Rockwell RN                   RN   mlb1                                                 

Ruby De Souza RN                      RN   ttb                                                  

Magdalena Miranda PA-C                    PAKATERYNA dt4                                                  

                                                                                                  

**************************************************************************************************

MTDD

## 2017-01-04 NOTE — EDDOCDS
Physician Documentation                                                                           

[facility]                                                                         

Name: Melva Tracy                                                                                

Age: 29 yrs                                                                                       

Sex: Female                                                                                       

: 1987                                                                                   

MRN: [id number]                                                                                     

Arrival Date: 2017                                                                          

Time: 16:58                                                                                       

Account#: [id number]                                                                              

Bed I4 / M4                                                                                       

Private MD: Unknown, Family                                                                     

Disposition:                                                                                      

17 22:18 Discharged to Home/Self Care. Impression: Threatened .                     

- Condition is Stable.                                                                            

- Discharge Instructions: Threatened Miscarriage.                                                 

                                                                                                  

- Medication Reconciliation, Local Pharmacy Hours form.                                           

- Follow up: Private Physician; When: 2 - 3 days; Reason: Recheck today's complaints,             

Continuance of care.                                                                            

- Problem is new.                                                                                 

- Symptoms have improved.                                                                         

- Notes: PLEASE FOLLOW UP WITH DR BHAKTA OR AN OB DOCTOR OF YOUR CHOICE , RETURN TO              

THE ER IF THE SYMPTOMS WORSEN OR BECOME CONCERNING, IF YOU ARE UNABLE TO FOLLOW UP              

WITH DR BHAKTA, PLEASE RETURN TO THE ER IN 48 HOURS FOR A REPEAT PREGNANCY HORMONE             

                                                                                                  

                                                                                                  

Historical:                                                                                       

- Allergies: no known allergies;                                                                  

- Home Meds:                                                                                      

1. none                                                                                         

- PSHx: none;                                                                                     

- Social history: Smoking status: Patient/guardian denies using No barriers to                    

communication noted, The patient speaks fluent English.                                         

- Family history: Not pertinent.                                                                  

- : The pt / caregiver states he / she is not on anticoagulants. Home medication list             

is obtained from the patient.                                                                   

- Exposure Risk Screening:: None identified.                                                      

                                                                                                  

                                                                                                  

OB/GYN:                                                                                           

                                                                                             

17:14  2, Full Term 1, Premature 0,  0, Living 1, LMP 2016, Pregnancy    dls 

      Verified, EDC 2017, Gestational age from LMP: 7 weeks 3 days                           

                                                                                                  

Vital Signs:                                                                                      

17:00  / 63; Pulse 80; Resp 18 S; Temp 98.5(O); Pulse Ox 99% on R/A; Weight 45.36 kg /  dd6 

      100 lbs (R); Height 4 ft. 11 in. (149.86 cm) (R);                                           

22:30 BP 97 / 55 LA Sitting (auto/reg); Pulse 62 MON; Resp 18 S; Temp 98.5(TE); Pulse Ox 98%  cln 

      on R/A; Pain 0/10;                                                                          

17:00 Body Mass Index 20.20 (45.36 kg, 149.86 cm)                                             dd6 

                                                                                                  

MDM:                                                                                              

18:51 Financial registration complete.                                                        gjb 

19:02 IV Saline Lock ordered.                                                                 ck7 

19:02 NS 0.9% 1000 ml IV at bolus once ordered.                                               ck7 

19:02 Set up pelvic ordered.                                                                  ck7 

19:03 Type & Screen Ordered.                                                                  EDMS

19:03 CBC with Diff Ordered.                                                                  EDMS

19:03 UA Ordered.                                                                             EDMS

19:03 Hcg, Serum Quantitative Ordered.                                                        EDMS

19:03 GC & Chlamydia Amplification Ordered.                                                   EDMS

19:03 Urine Culture Ordered.                                                                  EDMS

19:03 Wet Prep Ordered.                                                                       EDMS

19:03 US 1st trimester Ordered.                                                               EDMS

20:11 [facility] Payment Agreement was scanned into [employer] and attached to record.               gjb 

20:42 DUPLEX SCAN LIMITED (DOPPLER) Ordered.                                                  EDMS

21:15 UA Reviewed.                                                                            ck7 

21:15 CBC with Diff Reviewed.                                                                 ck7 

21:15 Type & Screen Reviewed.                                                                 ck7

21:15 Hcg, Serum Quantitative Reviewed.                                                       ck7 

21:15 US 1st trimester Reviewed.                                                              ck7 

21:44 Wet Prep Reviewed.                                                                      ck7 

                                                                                             

05:38 T-Sheet-- Draft Copy was scanned into [employer] and attached to record.                   lja 

                                                                                                  

Administered Medications:                                                                         

                                                                                             

19:27 Drug: NS 0.9% 1000 ml [sodium chloride 0.9 % intravenous solution] Route: IV; Rate:     lf1 

      bolus; Site: right antecubital;                                                             

                                                                                                  

                                                                                                  

Signatures:                                                                                       

Dispatcher [employer]                           Edie Munguia RN RN dls Ford, Lisa, RN RN   lf1                                                  

Buster Gauthier, RPA-C            [id number]                                                  

Rhys Marquez RN RN jmb Arel, Lisa lja Beck, Gabriela gjb                                                  

                                                                                                  

The chart was reviewed and I authenticate all verbal orders and agree with the evaluation and 
treatment provided.Attachments:

20:11 [facility] Payment Agreement                                                                gjb 

                                                                                             

05:38 T-Sheet-- Draft Copy                                                                    lja 

                                                                                                  

**************************************************************************************************



*** Chart Complete ***
MTDD

## 2017-01-04 NOTE — EDDOCDS
Physician Documentation                                                                           

[facility]                                                                         

Name: Melva Tracy                                                                                

Age: 29 yrs                                                                                       

Sex: Female                                                                                       

: 1987                                                                                   

MRN: [id number]                                                                                     

Arrival Date: 2017                                                                          

Time: 16:58                                                                                       

Account#: [id number]                                                                              

Bed I4 / M4                                                                                       

Private MD: Unknown, Family                                                                     

Disposition:                                                                                      

17 22:18 Discharged to Home/Self Care. Impression: Threatened .                     

- Condition is Stable.                                                                            

- Discharge Instructions: Threatened Miscarriage.                                                 

                                                                                                  

- Medication Reconciliation, Local Pharmacy Hours form.                                           

- Follow up: Private Physician; When: 2 - 3 days; Reason: Recheck today's complaints,             

Continuance of care.                                                                            

- Problem is new.                                                                                 

- Symptoms have improved.                                                                         

- Notes: PLEASE FOLLOW UP WITH DR BHAKTA OR AN OB DOCTOR OF YOUR CHOICE , RETURN TO              

THE ER IF THE SYMPTOMS WORSEN OR BECOME CONCERNING, IF YOU ARE UNABLE TO FOLLOW UP              

WITH DR BHAKTA, PLEASE RETURN TO THE ER IN 48 HOURS FOR A REPEAT PREGNANCY HORMONE             

                                                                                                  

                                                                                                  

Historical:                                                                                       

- Allergies: no known allergies;                                                                  

- Home Meds:                                                                                      

1. none                                                                                         

- PSHx: none;                                                                                     

- Social history: Smoking status: Patient/guardian denies using No barriers to                    

communication noted, The patient speaks fluent English.                                         

- Family history: Not pertinent.                                                                  

- : The pt / caregiver states he / she is not on anticoagulants. Home medication list             

is obtained from the patient.                                                                   

- Exposure Risk Screening:: None identified.                                                      

                                                                                                  

                                                                                                  

OB/GYN:                                                                                           

                                                                                             

17:14  2, Full Term 1, Premature 0,  0, Living 1, LMP 2016, Pregnancy    dls 

      Verified, EDC 2017, Gestational age from LMP: 7 weeks 3 days                           

                                                                                                  

Vital Signs:                                                                                      

17:00  / 63; Pulse 80; Resp 18 S; Temp 98.5(O); Pulse Ox 99% on R/A; Weight 45.36 kg /  dd6 

      100 lbs (R); Height 4 ft. 11 in. (149.86 cm) (R);                                           

22:30 BP 97 / 55 LA Sitting (auto/reg); Pulse 62 MON; Resp 18 S; Temp 98.5(TE); Pulse Ox 98%  cln 

      on R/A; Pain 0/10;                                                                          

17:00 Body Mass Index 20.20 (45.36 kg, 149.86 cm)                                             dd6 

                                                                                                  

MDM:                                                                                              

18:51 Financial registration complete.                                                        gjb 

19:02 IV Saline Lock ordered.                                                                 ck7 

19:02 NS 0.9% 1000 ml IV at bolus once ordered.                                               ck7 

19:02 Set up pelvic ordered.                                                                  ck7 

19:03 Type & Screen Ordered.                                                                  EDMS

19:03 CBC with Diff Ordered.                                                                  EDMS

19:03 UA Ordered.                                                                             EDMS

19:03 Hcg, Serum Quantitative Ordered.                                                        EDMS

19:03 GC & Chlamydia Amplification Ordered.                                                   EDMS

19:03 Urine Culture Ordered.                                                                  EDMS

19:03 Wet Prep Ordered.                                                                       EDMS

19:03 US 1st trimester Ordered.                                                               EDMS

20:11 [facility] Payment Agreement was scanned into [employer] and attached to record.               gjb 

20:42 DUPLEX SCAN LIMITED (DOPPLER) Ordered.                                                  EDMS

21:15 UA Reviewed.                                                                            ck7 

21:15 CBC with Diff Reviewed.                                                                 ck7 

21:15 Type & Screen Reviewed.                                                                 ck7

21:15 Hcg, Serum Quantitative Reviewed.                                                       ck7 

21:15 US 1st trimester Reviewed.                                                              ck7 

21:44 Wet Prep Reviewed.                                                                      ck7 

                                                                                             

05:38 T-Sheet-- Draft Copy was scanned into [employer] and attached to record.                   lja 

                                                                                                  

Administered Medications:                                                                         

                                                                                             

19:27 Drug: NS 0.9% 1000 ml [sodium chloride 0.9 % intravenous solution] Route: IV; Rate:     lf1 

      bolus; Site: right antecubital;                                                             

                                                                                                  

                                                                                                  

Signatures:                                                                                       

Dispatcher [employer]                           Edie Munguia RN RN dls Ford, Lisa, RN RN   lf1                                                  

Buster Gauthier, RPA-C            RPA-Cck7                                                  

Rhys Marquez RN RN jmb Arel, Lisa lja Beck, Gabriela gjb                                                  

                                                                                                  

The chart was reviewed and I authenticate all verbal orders and agree with the evaluation and 
treatment provided.Attachments:

20:11 [facility] Payment Agreement                                                                gjb 

                                                                                             

05:38 T-Sheet-- Draft Copy                                                                    lja 

                                                                                                  

**************************************************************************************************



*** Chart Complete ***
MTDD

## 2017-01-04 NOTE — EDDOCDS
Nurse's Notes                                                                                     

[facility]                                                                         

Name: Melva Tracy                                                                                

Age: 29 yrs                                                                                       

Sex: Female                                                                                       

: 1987                                                                                   

MRN: [id number]                                                                                     

Arrival Date: 2017                                                                          

Time: 10:26                                                                                       

Account#: [id number]                                                                              

Bed PR                                                                                      

Private MD: Unknown, Family Dr                                                                    

Diagnosis: Pregnant state                                                                         

                                                                                                  

Presentation:                                                                                     

                                                                                             

10:28 Presenting complaint: Patient states: Here for recheck Hcg. Adult Sepsis Screening: The mlb1

      patient does not have new or worsening altered mentation. Patient's respiratory rate is     

      less than 22. Systolic blood pressure is greater than 100. Patient has a qSOFA score of     

      0- Negative Sepsis Screen. Suicide/Homicide risk assessment- the patient denies having      

      any suicidal and/or homicidal ideations and does not present with any other emotional,      

      behavioral or mental health complaints. Transition of care: Patient is not a service        

      member or  dependent. patient was not received from another setting of care.        

10:28 Acuity: ARPAN Level 4                                                                     mlb1

10:28 Method Of Arrival: Walkin/Carried/Asstd                                                 mlb1

10:31  Status: Patient is not a  or  dependent.                 mlb1

10:32 Presenting complaint:.                                                                  mlb1

                                                                                                  

Triage Assessment:                                                                                

10:31 General: Appears in no apparent distress, comfortable, Behavior is appropriate for age, mlb1

      cooperative. Pain: Denies pain. HIV screening NA for this visit Offered previously.         

                                                                                                  

OB/GYN:                                                                                           

10:32 LMP 2016, Pregnancy Verified, EDC 2017, Gestational age from LMP: 7 weeks 5  mlb1

      days                                                                                        

                                                                                                  

Historical:                                                                                       

- Allergies: no known allergies;                                                                  

- Home Meds:                                                                                      

1. none                                                                                         

- PMHx: none;                                                                                     

- PSHx: none;                                                                                     

- Social history: Smoking status: Patient states was never smoker of tobacco. No                  

barriers to communication noted, The patient speaks fluent English, Speaks                      

appropriately for age.                                                                          

- Family history: Not pertinent.                                                                  

- : The pt / caregiver states he / she is not on anticoagulants. Home medication list             

is obtained from the patient.                                                                   

- Exposure Risk Screening:: None identified.                                                      

                                                                                                  

                                                                                                  

Screening:                                                                                        

10:31 Screening information is obtained from the patient. Fall risk: No risks identified.     mlb1

      Assistance ADL's: requires no assistance with activities of daily living. Abuse/DV          

      Screen: The patient / caregiver reports he/she is: not in a situation that causes fear,     

      pain or injury. Nutritional screening: No deficits noted. Advance Directives:               

      Currently, there is no health care proxy. home support is adequate.                         

                                                                                                  

Assessment:                                                                                       

11:53 Adult Sepsis Screening: The patient does not have new or worsening altered mentation.   ttb 

      Patient's respiratory rate is less than 22. Systolic blood pressure is greater than         

      100. Patient has a qSOFA score of 0- Negative Sepsis Screen. General: Appears in no         

      apparent distress, well nourished, well groomed, Behavior is appropriate for age,           

      cooperative, pleasant. Pain: Denies pain. Neurological: Level of Consciousness is           

      awake, alert. Cardiovascular: Chest pain is denied. Respiratory: No deficits noted.         

      Airway is patent Denies cough, shortness of breath. GI: Denies nausea, vomiting. :        

      Reports "Spotting". Derm: Skin is normal. Injury Description: No known injury.              

                                                                                                  

Vital Signs:                                                                                      

10:27  / 61; Pulse 69; Resp 18 S; Temp 97.7(O); Pulse Ox 100% on R/A; Weight 45.36 kg   dd6 

      (R); Height 4 ft. 11 in. (149.86 cm) (R);                                                   

11:38  / 61; Pulse 62; Resp 18; Temp 97.9(O); Pulse Ox 100% on R/A; Pain 1/10;          ct3 

10:27 Body Mass Index 20.20 (45.36 kg, 149.86 cm)                                             dd6 

                                                                                                  

Vitals:                                                                                           

10:27 Log In Time: 2017 at 10:25.                                                 dd6 

                                                                                                  

ED Course:                                                                                        

10:27 Patient visited by Aurelio Caceres PCA.                                             dd6 

10:27 Unknown, Family Dr is Private Physician.                                                dd6 

10:27 Patient moved to Waiting                                                                dd6 

10:28 Patient visited by Tyrell Rockwell, NETTIE.                                               mlb1

10:28 Patient moved to Pre RCE                                                                dd6 

10:30 Triage Initiated                                                                        mlb1

10:32 Patient visited by Tyrell Rockwell, NETTIE.                                               mlb1

10:32 The patient / caregiver is instructed regarding the plan of care and ED course.         mlb1

10:32 Patient moved to Triage 2                                                               mlb1

10:42 Hcg, Serum Quantitative Sent.                                                           ct3 

10:50 Magdalena Miranda PA-C is PHCP.                                                           dt4 

10:50 Cornelia Sanchez MD is Attending Physician.                                      dt4 

10:50 Patient visited by Magdalena Miranda PA-C.                                                dt4 

11:04 Patient moved to TR2                                                                    ct3 

11:22 Patient visited by Ariella Sewell PCA.                                              ct3 

11:32 Patient moved to PR1 / 25                                                               ct3 

11:38 Patient visited by Ariella Sewell PCA.                                              ct3 

11:47 Javier Rivero is Referral Physician.                                                  dt4 

11:53 Accompanied by Significant Other, Patient has correct armband on for positive           ttb 

      identification.                                                                             

11:53 No IV's were initiated during this patient's visit. No procedures done that require     ttb 

      assistance. Labs drawn. (by ED staff).                                                      

                                                                                                  

Order Results:                                                                                    

Lab Order: Hcg, Serum Quantitative; SPEC'M 17 10:42                                         

      Test: HCG, SERUM QUANTITATIVE; Value: [id number]; Units: MIU/ML; Status: F                       

      Test Note: &nbsp;; GESTATIONAL AGE APPROXIMATE HCG RANGE (MIU/ML) -----------             

      -------------- 0.2-1 WEEK 5-50 1-2 WEEKS  2-3 WEEKS 100-5,000 3-4 WEEKS               

      500-10,000 4-5 WEEKS 1,000-50,000 5-6 WEEKS 10,000-100,000 6-8 WEEKS 15,000-200,000 2-3     

      MONTHS 10,000-100,000 NON PREGNANT FEMALES LESS THAN 3.0 Patient samples may contain        

      human heterophilic antibodies that could react with immunoassays to give falsely            

      elevated or depressed results. This assay has been designed to minimize interference        

      from heterophilic antibodies. Elevated hCG levels have also been associated with            

      trophoblastic disease and nontrophoblastic neoplasms. The possibility of having these       

      diseases should be considered before a diagnosis of pregnancy is made. This test is not     

      intended for use as a surrogate marker for aiding in the diagnosis or monitoring the        

      treatment of cancer patients. Siemens Vista methodology.                                    

                                                                                                  

Outcome:                                                                                          

11:47 Discharge ordered by Provider.                                                          dt4 

11:53 Discharge Assessment: Patient awake, alert and oriented x 3. No cognitive and/or        ttb 

      functional deficits noted. Patient verbalized understanding of disposition                  

      instructions. Patient awake and alert. patient administered narcotics - no. The             

      following High Risk Discharge criteria are identified: None. Discharged to home             

      ambulatory, with significant other. Condition: good Condition: stable Condition:            

      improved. Discharge instructions given to patient, significant other, Instructed on         

      discharge instructions, follow up and referral plans. medication usage, pelvic rest         

      Demonstrated understanding of instructions, medications, stated understading Pt was         

      receptive of discharge instructions/ teaching. No special radiology studies were            

      completed. Property :Personal belongings accompany Pt.                                      

11:55 Patient left the ED.                                                                    ttb 

                                                                                                  

Signatures:                                                                                       

Tyrell Rockwell RN                   RN   mlb1                                                 

Aurelio Caceres, PCA                 PCA  dd6                                                  

Ariella Sewell, PCA                  PCA  ct3                                                  

Ruby De Souza RN                      RN   ttb                                                  

Magdalena Miranda PA-C                    PAKATERYNA dt4                                                  

                                                                                                  

Corrections: (The following items were deleted from the chart)                                    

10:31 10:28 Presenting complaint: Patient states: Here for recheck HcG mlb1                   mlb1

                                                                                                  

**************************************************************************************************

MTDD

## 2017-01-06 NOTE — EDDOCDS
Physician Documentation                                                                           

[facility]                                                                         

Name: Melva Tracy                                                                                

Age: 29 yrs                                                                                       

Sex: Female                                                                                       

: 1987                                                                                   

MRN: [id number]                                                                                     

Arrival Date: 2017                                                                          

Time: 10:26                                                                                       

Account#: [id number]                                                                              

Bed PR                                                                                      

Private MD: Unknown, Family                                                                     

Disposition:                                                                                      

17 11:47 Discharged to Home/Self Care. Impression: Pregnant state.                          

- Condition is Stable.                                                                            

- Discharge Instructions: First Trimester of Pregnancy.                                           

                                                                                                  

- Medication Reconciliation, Local Pharmacy Hours form.                                           

- Follow up: Emergency Department; When: As needed; Reason: Worsening of conditions.              

Follow up: Javier Rivero; When: Call to arrange an appointment; Reason:                       

Wound/Symptom Recheck, Further diagnostic work-up, Recheck today's complaints,                  

Continuance of care, To establish care.                                                         

- Problem is new.                                                                                 

- Symptoms are unchanged.                                                                         

- Notes: YOUR HCG ON 17 WAS 49,549. TODAY, IT WAS 56,368 WHICH IS A NORMAL INCREASE           

FOR A HEALTHY PREGNANCY. PLEASE CALL THE OB-GYN OFFICE TODAY TO SCHEDULE AN                     

APPOINTMENT TO BE SEEN. LET THEM KNOW YOU WERE SEEN IN THE ER AND THE SYMPTOMS YOU              

WERE HAVING. ANY WORSENING SYMPTOMS, PLEASE RETURN TO THE ER.                                   

                                                                                                  

                                                                                                  

Historical:                                                                                       

- Allergies: no known allergies;                                                                  

- Home Meds:                                                                                      

1. none                                                                                         

- PMHx: none;                                                                                     

- PSHx: none;                                                                                     

- Social history: Smoking status: Patient states was never smoker of tobacco. No                  

barriers to communication noted, The patient speaks fluent English, Speaks                      

appropriately for age.                                                                          

- Family history: Not pertinent.                                                                  

- : The pt / caregiver states he / she is not on anticoagulants. Home medication list             

is obtained from the patient.                                                                   

- Exposure Risk Screening:: None identified.                                                      

                                                                                                  

                                                                                                  

OB/GYN:                                                                                           

                                                                                             

10:32 LMP 2016, Pregnancy Verified, EDC 2017, Gestational age from LMP: 7 weeks 5  mlb1

      days                                                                                        

                                                                                                  

Vital Signs:                                                                                      

10:27  / 61; Pulse 69; Resp 18 S; Temp 97.7(O); Pulse Ox 100% on R/A; Weight 45.36 kg / dd6 

      100 lbs (R); Height 4 ft. 11 in. (149.86 cm) (R);                                           

11:38  / 61; Pulse 62; Resp 18; Temp 97.9(O); Pulse Ox 100% on R/A; Pain 10;          ct3 

10:27 Body Mass Index 20.20 (45.36 kg, 149.86 cm)                                             dd6 

                                                                                                  

MDM:                                                                                              

10:39 Hcg, Serum Quantitative Ordered.                                                        EDMS

11:12 Financial registration complete.                                                        lg  

11:57 [facility] Payment Agreement was scanned into [email or web address] and attached to record.               mm15

14:43 T-Sheet-- Draft Copy was scanned into [email or web address] and attached to record.                   gb  

                                                                                                  

Signatures:                                                                                       

Dispatcher [employer]                           EDMS                                                 

Fartun Donnelly, Reg                  Reg  gb                                                   

Berta Orourke, Reg                    Reg  lg                                                   

Tyrell Rockwell RN                   RN   mlb1                                                 

Ruby De Souza RN                      RN   zekeb                                                  

Fuad Ruff                             mm15                                                 

Magdalena Miranda PA-C PA-C dt4                                                  

                                                                                                  

The chart was reviewed and I authenticate all verbal orders and agree with the evaluation and 
treatment provided.Attachments:

11:57 [facility] Payment Agreement                                                                mm15

14:43 T-Sheet-- Draft Copy                                                                    gb  

                                                                                                  

**************************************************************************************************



*** Chart Complete ***
MTDD

## 2017-01-06 NOTE — EDDOCDS
Nurse's Notes                                                                                     

[facility]                                                                         

Name: Mleva Tracy                                                                                

Age: 29 yrs                                                                                       

Sex: Female                                                                                       

: 1987                                                                                   

MRN: [id number]                                                                                     

Arrival Date: 2017                                                                          

Time: 10:26                                                                                       

Account#: [id number]                                                                              

Bed PR                                                                                      

Private MD: Unknown, Family Dr                                                                    

Diagnosis: Pregnant state                                                                         

                                                                                                  

Presentation:                                                                                     

                                                                                             

10:28 Presenting complaint: Patient states: Here for recheck Hcg. Adult Sepsis Screening: The mlb1

      patient does not have new or worsening altered mentation. Patient's respiratory rate is     

      less than 22. Systolic blood pressure is greater than 100. Patient has a qSOFA score of     

      0- Negative Sepsis Screen. Suicide/Homicide risk assessment- the patient denies having      

      any suicidal and/or homicidal ideations and does not present with any other emotional,      

      behavioral or mental health complaints. Transition of care: Patient is not a service        

      member or  dependent. patient was not received from another setting of care.        

10:28 Acuity: ARPAN Level 4                                                                     mlb1

10:28 Method Of Arrival: Walkin/Carried/Asstd                                                 mlb1

10:31  Status: Patient is not a  or  dependent.                 mlb1

10:32 Presenting complaint:.                                                                  mlb1

                                                                                                  

Triage Assessment:                                                                                

10:31 General: Appears in no apparent distress, comfortable, Behavior is appropriate for age, mlb1

      cooperative. Pain: Denies pain. HIV screening NA for this visit Offered previously.         

                                                                                                  

OB/GYN:                                                                                           

10:32 LMP 2016, Pregnancy Verified, EDC 2017, Gestational age from LMP: 7 weeks 5  mlb1

      days                                                                                        

                                                                                                  

Historical:                                                                                       

- Allergies: no known allergies;                                                                  

- Home Meds:                                                                                      

1. none                                                                                         

- PMHx: none;                                                                                     

- PSHx: none;                                                                                     

- Social history: Smoking status: Patient states was never smoker of tobacco. No                  

barriers to communication noted, The patient speaks fluent English, Speaks                      

appropriately for age.                                                                          

- Family history: Not pertinent.                                                                  

- : The pt / caregiver states he / she is not on anticoagulants. Home medication list             

is obtained from the patient.                                                                   

- Exposure Risk Screening:: None identified.                                                      

                                                                                                  

                                                                                                  

Screening:                                                                                        

10:31 Screening information is obtained from the patient. Fall risk: No risks identified.     mlb1

      Assistance ADL's: requires no assistance with activities of daily living. Abuse/DV          

      Screen: The patient / caregiver reports he/she is: not in a situation that causes fear,     

      pain or injury. Nutritional screening: No deficits noted. Advance Directives:               

      Currently, there is no health care proxy. home support is adequate.                         

                                                                                                  

Assessment:                                                                                       

11:53 Adult Sepsis Screening: The patient does not have new or worsening altered mentation.   ttb 

      Patient's respiratory rate is less than 22. Systolic blood pressure is greater than         

      100. Patient has a qSOFA score of 0- Negative Sepsis Screen. General: Appears in no         

      apparent distress, well nourished, well groomed, Behavior is appropriate for age,           

      cooperative, pleasant. Pain: Denies pain. Neurological: Level of Consciousness is           

      awake, alert. Cardiovascular: Chest pain is denied. Respiratory: No deficits noted.         

      Airway is patent Denies cough, shortness of breath. GI: Denies nausea, vomiting. :        

      Reports "Spotting". Derm: Skin is normal. Injury Description: No known injury.              

                                                                                                  

Vital Signs:                                                                                      

10:27  / 61; Pulse 69; Resp 18 S; Temp 97.7(O); Pulse Ox 100% on R/A; Weight 45.36 kg   dd6 

      (R); Height 4 ft. 11 in. (149.86 cm) (R);                                                   

11:38  / 61; Pulse 62; Resp 18; Temp 97.9(O); Pulse Ox 100% on R/A; Pain 1/10;          ct3 

10:27 Body Mass Index 20.20 (45.36 kg, 149.86 cm)                                             dd6 

                                                                                                  

Vitals:                                                                                           

10:27 Log In Time: 2017 at 10:25.                                                 dd6 

                                                                                                  

ED Course:                                                                                        

10:27 Patient visited by Aurelio Caceres PCA.                                             dd6 

10:27 Unknown, Family Dr is Private Physician.                                                dd6 

10:27 Patient moved to Waiting                                                                dd6 

10:28 Patient visited by Tyrell Rockwell, NETTIE.                                               mlb1

10:28 Patient moved to Pre RCE                                                                dd6 

10:30 Triage Initiated                                                                        mlb1

10:32 Patient visited by Tyrell Rockwell, NETTIE.                                               mlb1

10:32 The patient / caregiver is instructed regarding the plan of care and ED course.         mlb1

10:32 Patient moved to Triage 2                                                               mlb1

10:42 Hcg, Serum Quantitative Sent.                                                           ct3 

10:50 Magdalena Miranda PA-C is PHCP.                                                           dt4 

10:50 Cornelia Sanchez MD is Attending Physician.                                      dt4 

10:50 Patient visited by Magdalena Miranda PA-C.                                                dt4 

11:04 Patient moved to TR2                                                                    ct3 

11:22 Patient visited by Ariella Sewell PCA.                                              ct3 

11:32 Patient moved to PR1 / 25                                                               ct3 

11:38 Patient visited by Ariella Sewell PCA.                                              ct3 

11:47 Javier Rivero is Referral Physician.                                                  dt4 

11:53 Accompanied by Significant Other, Patient has correct armband on for positive           ttb 

      identification.                                                                             

11:53 No IV's were initiated during this patient's visit. No procedures done that require     ttb 

      assistance. Labs drawn. (by ED staff).                                                      

11:57 [facility] Payment Agreement was scanned into [email or web address] and attached to record.               mm15

14:43 T-Sheet-- Draft Copy was scanned into [email or web address] and attached to record.                   gb  

                                                                                                  

Order Results:                                                                                    

Lab Order: Hcg, Serum Quantitative; SPEC'M 17 10:42                                         

      Test: HCG, SERUM QUANTITATIVE; Value: [id number]; Units: MIU/ML; Status: F                       

      Test Note: &nbsp;; GESTATIONAL AGE APPROXIMATE HCG RANGE (MIU/ML) -----------             

      -------------- 0.2-1 WEEK 5-50 1-2 WEEKS  2-3 WEEKS 100-5,000 3-4 WEEKS               

      500-10,000 4-5 WEEKS 1,000-50,000 5-6 WEEKS 10,000-100,000 6-8 WEEKS 15,000-200,000 2-3     

      MONTHS 10,000-100,000 NON PREGNANT FEMALES LESS THAN 3.0 Patient samples may contain        

      human heterophilic antibodies that could react with immunoassays to give falsely            

      elevated or depressed results. This assay has been designed to minimize interference        

      from heterophilic antibodies. Elevated hCG levels have also been associated with            

      trophoblastic disease and nontrophoblastic neoplasms. The possibility of having these       

      diseases should be considered before a diagnosis of pregnancy is made. This test is not     

      intended for use as a surrogate marker for aiding in the diagnosis or monitoring the        

      treatment of cancer patients. Siemens Vista methodology.                                    

                                                                                                  

Outcome:                                                                                          

11:47 Discharge ordered by Provider.                                                          dt4 

11:53 Discharge Assessment: Patient awake, alert and oriented x 3. No cognitive and/or        ttb 

      functional deficits noted. Patient verbalized understanding of disposition                  

      instructions. Patient awake and alert. patient administered narcotics - no. The             

      following High Risk Discharge criteria are identified: None. Discharged to home             

      ambulatory, with significant other. Condition: good Condition: stable Condition:            

      improved. Discharge instructions given to patient, significant other, Instructed on         

      discharge instructions, follow up and referral plans. medication usage, pelvic rest         

      Demonstrated understanding of instructions, medications, stated understading Pt was         

      receptive of discharge instructions/ teaching. No special radiology studies were            

      completed. Property :Personal belongings accompany Pt.                                      

11:55 Patient left the ED.                                                                    ttb 

                                                                                                  

Signatures:                                                                                       

Fartun Donnelly, Reg                  Reg  gb                                                   

Tyrell Rockwell RN                   RN   mlb1                                                 

Aurelio Caceres, PCA                 PCA  dd6                                                  

Ariella Sewell, PCA                  PCA  ct3                                                  

Ruby De Souza RN                      RN   ttb                                                  

Fuad Ruff                             mm15                                                 

Magdalena Miranda PA-C                    PAKATERYNA dt4                                                  

                                                                                                  

Corrections: (The following items were deleted from the chart)                                    

10:31 10:28 Presenting complaint: Patient states: Here for recheck HcG mlb1                   mlb1

                                                                                                  

**************************************************************************************************



*** Chart Complete ***
MTDD

## 2017-01-06 NOTE — EDDOCDS
Physician Documentation                                                                           

[facility]                                                                         

Name: Melva Tracy                                                                                

Age: 29 yrs                                                                                       

Sex: Female                                                                                       

: 1987                                                                                   

MRN: [id number]                                                                                     

Arrival Date: 2017                                                                          

Time: 10:26                                                                                       

Account#: [id number]                                                                              

Bed PR                                                                                      

Private MD: Unknown, Family                                                                     

Disposition:                                                                                      

17 11:47 Discharged to Home/Self Care. Impression: Pregnant state.                          

- Condition is Stable.                                                                            

- Discharge Instructions: First Trimester of Pregnancy.                                           

                                                                                                  

- Medication Reconciliation, Local Pharmacy Hours form.                                           

- Follow up: Emergency Department; When: As needed; Reason: Worsening of conditions.              

Follow up: Javier Rivero; When: Call to arrange an appointment; Reason:                       

Wound/Symptom Recheck, Further diagnostic work-up, Recheck today's complaints,                  

Continuance of care, To establish care.                                                         

- Problem is new.                                                                                 

- Symptoms are unchanged.                                                                         

- Notes: YOUR HCG ON 17 WAS 49,549. TODAY, IT WAS 56,368 WHICH IS A NORMAL INCREASE           

FOR A HEALTHY PREGNANCY. PLEASE CALL THE OB-GYN OFFICE TODAY TO SCHEDULE AN                     

APPOINTMENT TO BE SEEN. LET THEM KNOW YOU WERE SEEN IN THE ER AND THE SYMPTOMS YOU              

WERE HAVING. ANY WORSENING SYMPTOMS, PLEASE RETURN TO THE ER.                                   

                                                                                                  

                                                                                                  

Historical:                                                                                       

- Allergies: no known allergies;                                                                  

- Home Meds:                                                                                      

1. none                                                                                         

- PMHx: none;                                                                                     

- PSHx: none;                                                                                     

- Social history: Smoking status: Patient states was never smoker of tobacco. No                  

barriers to communication noted, The patient speaks fluent English, Speaks                      

appropriately for age.                                                                          

- Family history: Not pertinent.                                                                  

- : The pt / caregiver states he / she is not on anticoagulants. Home medication list             

is obtained from the patient.                                                                   

- Exposure Risk Screening:: None identified.                                                      

                                                                                                  

                                                                                                  

OB/GYN:                                                                                           

                                                                                             

10:32 LMP 2016, Pregnancy Verified, EDC 2017, Gestational age from LMP: 7 weeks 5  mlb1

      days                                                                                        

                                                                                                  

Vital Signs:                                                                                      

10:27  / 61; Pulse 69; Resp 18 S; Temp 97.7(O); Pulse Ox 100% on R/A; Weight 45.36 kg / dd6 

      100 lbs (R); Height 4 ft. 11 in. (149.86 cm) (R);                                           

11:38  / 61; Pulse 62; Resp 18; Temp 97.9(O); Pulse Ox 100% on R/A; Pain 10;          ct3 

10:27 Body Mass Index 20.20 (45.36 kg, 149.86 cm)                                             dd6 

                                                                                                  

MDM:                                                                                              

10:39 Hcg, Serum Quantitative Ordered.                                                        EDMS

11:12 Financial registration complete.                                                        lg  

11:57 [facility] Payment Agreement was scanned into [employer] and attached to record.               mm15

14:43 T-Sheet-- Draft Copy was scanned into [employer] and attached to record.                   gb  

                                                                                                  

Signatures:                                                                                       

Dispatcher [employer]                           EDMS                                                 

Fartun Donnelly, Reg                  Reg  gb                                                   

Berta Orourke, Reg                    Reg  lg                                                   

Tyrell Rockwell RN                   RN   mlb1                                                 

Ruby De Souza RN                      RN   zekeb                                                  

Fuad Ruff                             mm15                                                 

Magdalena Miranda PA-C PA-C dt4                                                  

                                                                                                  

The chart was reviewed and I authenticate all verbal orders and agree with the evaluation and 
treatment provided.Attachments:

11:57 [facility] Payment Agreement                                                                mm15

14:43 T-Sheet-- Draft Copy                                                                    gb  

                                                                                                  

**************************************************************************************************



*** Chart Complete ***
MTDD